# Patient Record
Sex: MALE | URBAN - METROPOLITAN AREA
[De-identification: names, ages, dates, MRNs, and addresses within clinical notes are randomized per-mention and may not be internally consistent; named-entity substitution may affect disease eponyms.]

---

## 2023-04-03 ENCOUNTER — APPOINTMENT (RX ONLY)
Dept: URBAN - METROPOLITAN AREA CLINIC 51 | Facility: CLINIC | Age: 48
Setting detail: DERMATOLOGY
End: 2023-04-03

## 2023-04-03 DIAGNOSIS — L20.89 OTHER ATOPIC DERMATITIS: ICD-10-CM | Status: INADEQUATELY CONTROLLED

## 2023-04-03 DIAGNOSIS — L85.3 XEROSIS CUTIS: ICD-10-CM

## 2023-04-03 DIAGNOSIS — L82.1 OTHER SEBORRHEIC KERATOSIS: ICD-10-CM

## 2023-04-03 DIAGNOSIS — L81.4 OTHER MELANIN HYPERPIGMENTATION: ICD-10-CM

## 2023-04-03 DIAGNOSIS — D485 NEOPLASM OF UNCERTAIN BEHAVIOR OF SKIN: ICD-10-CM

## 2023-04-03 DIAGNOSIS — D18.0 HEMANGIOMA: ICD-10-CM

## 2023-04-03 DIAGNOSIS — Z71.89 OTHER SPECIFIED COUNSELING: ICD-10-CM

## 2023-04-03 PROBLEM — D18.01 HEMANGIOMA OF SKIN AND SUBCUTANEOUS TISSUE: Status: ACTIVE | Noted: 2023-04-03

## 2023-04-03 PROBLEM — L20.84 INTRINSIC (ALLERGIC) ECZEMA: Status: ACTIVE | Noted: 2023-04-03

## 2023-04-03 PROBLEM — D48.5 NEOPLASM OF UNCERTAIN BEHAVIOR OF SKIN: Status: ACTIVE | Noted: 2023-04-03

## 2023-04-03 PROCEDURE — 99204 OFFICE O/P NEW MOD 45 MIN: CPT

## 2023-04-03 PROCEDURE — ? TREATMENT REGIMEN

## 2023-04-03 PROCEDURE — ? FULL BODY SKIN EXAM

## 2023-04-03 PROCEDURE — ? DEFER

## 2023-04-03 PROCEDURE — ? PRESCRIPTION

## 2023-04-03 PROCEDURE — ? COUNSELING

## 2023-04-03 PROCEDURE — ? ORDER ULTRASOUND

## 2023-04-03 RX ORDER — TRIAMCINOLONE ACETONIDE 1 MG/G
CREAM TOPICAL BID
Qty: 453.6 | Refills: 6 | Status: CANCELLED
Stop reason: CLARIF

## 2023-04-03 ASSESSMENT — ITCH NUMERIC RATING SCALE: HOW SEVERE IS YOUR ITCHING?: 3

## 2023-04-03 ASSESSMENT — LOCATION DETAILED DESCRIPTION DERM
LOCATION DETAILED: RIGHT BUTTOCK
LOCATION DETAILED: LEFT INFERIOR MEDIAL MIDBACK
LOCATION DETAILED: RIGHT INFERIOR UPPER BACK
LOCATION DETAILED: LEFT INFERIOR MEDIAL UPPER BACK
LOCATION DETAILED: LEFT MEDIAL UPPER BACK
LOCATION DETAILED: LEFT SUPERIOR MEDIAL MIDBACK
LOCATION DETAILED: RIGHT DISTAL PRETIBIAL REGION

## 2023-04-03 ASSESSMENT — SEVERITY ASSESSMENT 2020: SEVERITY 2020: MILD

## 2023-04-03 ASSESSMENT — BSA RASH: BSA RASH: 2

## 2023-04-03 ASSESSMENT — LOCATION SIMPLE DESCRIPTION DERM
LOCATION SIMPLE: LEFT UPPER BACK
LOCATION SIMPLE: RIGHT UPPER BACK
LOCATION SIMPLE: RIGHT PRETIBIAL REGION
LOCATION SIMPLE: RIGHT BUTTOCK
LOCATION SIMPLE: LEFT LOWER BACK

## 2023-04-03 ASSESSMENT — LOCATION ZONE DERM
LOCATION ZONE: LEG
LOCATION ZONE: TRUNK

## 2023-04-03 NOTE — PROCEDURE: DEFER
X Size Of Lesion In Cm (Optional): 4
Introduction Text (Please End With A Colon): The following procedure was deferred:excision
Detail Level: Detailed
Size Of Lesion In Cm (Optional): 8
Scheduling Instructions (Optional): Lety
X Size Of Lesion In Cm (Optional): 1
Size Of Lesion In Cm (Optional): 0
Introduction Text (Please End With A Colon): The following procedure was deferred:shave biopsy

## 2023-04-03 NOTE — PROCEDURE: ORDER ULTRASOUND
Lesion Location: right posterior thigh
Subcutaneous Lesion Ultrasound Reason: Possible excision, ultrasound growth
Priority: normal
Ultrasound Protocol: Ultrasound of Subcutaneous Mass
Detail Level: Simple
Provider: Dr. Yosvany Davidson MD

## 2023-04-03 NOTE — HPI: EVALUATION OF SKIN LESION(S)
What Type Of Note Output Would You Prefer (Optional)?: Standard Output
Hpi Title: Evaluation of Skin Lesions
How Severe Are Your Spot(S)?: mild
Have Your Spot(S) Been Treated In The Past?: has not been treated
Additional History: Patient has a concerning lesion on his lower back